# Patient Record
Sex: MALE | Race: BLACK OR AFRICAN AMERICAN | Employment: UNEMPLOYED | URBAN - METROPOLITAN AREA
[De-identification: names, ages, dates, MRNs, and addresses within clinical notes are randomized per-mention and may not be internally consistent; named-entity substitution may affect disease eponyms.]

---
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
## 2023-07-11 ENCOUNTER — HOSPITAL ENCOUNTER (EMERGENCY)
Facility: HOSPITAL | Age: 18
Discharge: HOME | End: 2023-07-11
Attending: EMERGENCY MEDICINE
Payer: COMMERCIAL

## 2023-07-11 VITALS
RESPIRATION RATE: 14 BRPM | WEIGHT: 138 LBS | SYSTOLIC BLOOD PRESSURE: 108 MMHG | HEART RATE: 77 BPM | OXYGEN SATURATION: 98 % | TEMPERATURE: 98.7 F | HEIGHT: 73 IN | BODY MASS INDEX: 18.29 KG/M2 | DIASTOLIC BLOOD PRESSURE: 56 MMHG

## 2023-07-11 DIAGNOSIS — I86.1 LEFT VARICOCELE: Primary | ICD-10-CM

## 2023-07-11 LAB
APPEARANCE UR: CLEAR
BACTERIA URNS QL MICRO: NEGATIVE /HPF
BILIRUB UR QL: NEGATIVE
COLOR UR: ABNORMAL
EPITH CASTS URNS QL MICRO: ABNORMAL /LPF
GLUCOSE UR STRIP.AUTO-MCNC: NEGATIVE MG/DL
HGB UR QL STRIP: NEGATIVE
KETONES UR QL STRIP.AUTO: ABNORMAL MG/DL
LEUKOCYTE ESTERASE UR QL STRIP.AUTO: NEGATIVE
NITRITE UR QL STRIP.AUTO: NEGATIVE
PH UR STRIP: 6.5 (ref 5–8)
PROT UR STRIP-MCNC: 30 MG/DL
RBC #/AREA URNS HPF: ABNORMAL /HPF (ref 0–5)
SP GR UR REFRACTOMETRY: 1.02
URINE CULTURE IF INDICATED: ABNORMAL
UROBILINOGEN UR QL STRIP.AUTO: 1 EU/DL (ref 0.2–1)
WBC URNS QL MICRO: ABNORMAL /HPF (ref 0–4)

## 2023-07-11 PROCEDURE — 99283 EMERGENCY DEPT VISIT LOW MDM: CPT

## 2023-07-11 PROCEDURE — 81001 URINALYSIS AUTO W/SCOPE: CPT

## 2023-07-11 ASSESSMENT — PAIN - FUNCTIONAL ASSESSMENT: PAIN_FUNCTIONAL_ASSESSMENT: NONE - DENIES PAIN

## 2023-07-11 NOTE — DISCHARGE INSTRUCTIONS
Please follow-up with your primary care doctor. Return for new or worsening symptoms anytime. You can also follow-up with the urologist as needed.

## 2023-07-11 NOTE — ED TRIAGE NOTES
Denies testicular pain but reports that he was checking his testicles last night and one felt different from the other.   States felt like one of them has \"busted\"

## 2023-07-11 NOTE — ED PROVIDER NOTES
[address] EMERGENCY DEPT  EMERGENCY DEPARTMENT ENCOUNTER       Pt Name: Rocky Jo  MRN: [id number]  [address] [facility] 2005  Date of evaluation: 7/11/2023  Provider: Latanya Khan MD   PCP: Ramiro Sawant MD  Note Started: 7:48 PM EDT 7/11/23     CHIEF COMPLAINT       Chief Complaint   Patient presents with    Testicle Pain        HISTORY OF PRESENT ILLNESS: 1 or more elements      History From: patient, History limited by: none     Rocky Jo is a 21 y.o. male presents to the emergency department with a testicular mass. Patient reports he was feeling his testicles last evening and he noted a mass that felt like \"a parasite. \"  He denies any pain. Denies trauma. Denies urinary symptoms or dysuria or hematuria. No history of intra-abdominal surgeries. No nausea or vomiting. Please See MDM for Additional Details of the HPI/PMH  Nursing Notes were all reviewed and agreed with or any disagreements were addressed in the HPI. REVIEW OF SYSTEMS        Positives and Pertinent negatives as per HPI. PAST HISTORY     Past Medical History:  Past Medical History:   Diagnosis Date    Other ill-defined conditions(829.89)     heart mumur       Past Surgical History:  History reviewed. No pertinent surgical history. Family History:  Family History   Problem Relation Age of Onset    Allergic Rhinitis Sister     Asthma Sister        Social History:  Social History     Tobacco Use    Smoking status: Passive Smoke Exposure - Never Smoker    Smokeless tobacco: Never   Substance Use Topics    Alcohol use: No    Drug use: Yes     Types: Marijuana Araceli Chars)       Allergies: Allergies   Allergen Reactions    Amoxicillin Other (See Comments)     States does not remember reaction.   Mother always told him he was allergic    Penicillins Hives       CURRENT MEDICATIONS      Discharge Medication List as of 7/11/2023  7:56 PM        CONTINUE these medications which have NOT CHANGED    Details   predniSONE 10 MG (21)

## 2024-01-01 ENCOUNTER — HOSPITAL ENCOUNTER (EMERGENCY)
Facility: HOSPITAL | Age: 19
Discharge: HOME | End: 2024-01-01
Attending: EMERGENCY MEDICINE

## 2024-01-01 VITALS
RESPIRATION RATE: 16 BRPM | HEART RATE: 75 BPM | DIASTOLIC BLOOD PRESSURE: 84 MMHG | OXYGEN SATURATION: 99 % | SYSTOLIC BLOOD PRESSURE: 133 MMHG | TEMPERATURE: 98.4 F

## 2024-01-01 DIAGNOSIS — K08.89 DENTALGIA: Primary | ICD-10-CM

## 2024-01-01 PROCEDURE — 6370000000 HC RX 637 (ALT 250 FOR IP): Performed by: EMERGENCY MEDICINE

## 2024-01-01 PROCEDURE — 99283 EMERGENCY DEPT VISIT LOW MDM: CPT

## 2024-01-01 RX ORDER — NAPROXEN 500 MG/1
500 TABLET ORAL 2 TIMES DAILY WITH MEALS
Qty: 20 TABLET | Refills: 0 | Status: SHIPPED | OUTPATIENT
Start: 2024-01-01

## 2024-01-01 RX ORDER — CLINDAMYCIN HYDROCHLORIDE 150 MG/1
450 CAPSULE ORAL 3 TIMES DAILY
Qty: 63 CAPSULE | Refills: 0 | Status: SHIPPED | OUTPATIENT
Start: 2024-01-01 | End: 2024-01-08

## 2024-01-01 RX ADMIN — DIPHENHYDRAMINE HYDROCHLORIDE: 25 SOLUTION ORAL at 02:43

## 2024-01-01 ASSESSMENT — PAIN - FUNCTIONAL ASSESSMENT: PAIN_FUNCTIONAL_ASSESSMENT: 0-10

## 2024-01-01 ASSESSMENT — LIFESTYLE VARIABLES
HOW OFTEN DO YOU HAVE A DRINK CONTAINING ALCOHOL: NEVER
HOW MANY STANDARD DRINKS CONTAINING ALCOHOL DO YOU HAVE ON A TYPICAL DAY: PATIENT DOES NOT DRINK

## 2024-01-01 ASSESSMENT — PAIN DESCRIPTION - DESCRIPTORS: DESCRIPTORS: ACHING

## 2024-01-01 ASSESSMENT — PAIN SCALES - GENERAL: PAINLEVEL_OUTOF10: 10

## 2024-01-01 ASSESSMENT — PAIN DESCRIPTION - ORIENTATION: ORIENTATION: RIGHT

## 2024-01-01 ASSESSMENT — PAIN DESCRIPTION - LOCATION: LOCATION: TEETH

## 2024-01-01 NOTE — ED PROVIDER NOTES
[facility] EMERGENCY DEPT  EMERGENCY DEPARTMENT ENCOUNTER       Pt Name: Juan Carlos Corrales  MRN: [id number]  Birthdate 2005  Date of evaluation: 1/1/2024  Provider: Halie Morrissey MD   PCP: Ngozi Blair MD  Note Started: 2:34 AM EST 1/1/24     CHIEF COMPLAINT       Chief Complaint   Patient presents with    Dental Pain        HISTORY OF PRESENT ILLNESS: 1 or more elements      History From: patient, History limited by: none     Juan Carlos Corrales is a 18 y.o. male who presents with a cc of dental pain       Please See MDM for Additional Details of the HPI/PMH  Nursing Notes were all reviewed and agreed with or any disagreements were addressed in the HPI.     REVIEW OF SYSTEMS        Positives and Pertinent negatives as per HPI.    PAST HISTORY     Past Medical History:  Past Medical History:   Diagnosis Date    Other ill-defined conditions(839.89)     heart mumur       Past Surgical History:  History reviewed. No pertinent surgical history.    Family History:  Family History   Problem Relation Age of Onset    Allergic Rhinitis Sister     Asthma Sister        Social History:  Social History     Tobacco Use    Smoking status: Passive Smoke Exposure - Never Smoker    Smokeless tobacco: Never   Substance Use Topics    Alcohol use: No    Drug use: Yes     Types: Marijuana (Weed)       Allergies:  Allergies   Allergen Reactions    Amoxicillin Other (See Comments)     States does not remember reaction.  Mother always told him he was allergic    Penicillins Hives       CURRENT MEDICATIONS      Previous Medications    PREDNISONE 10 MG (21) TBPK    Take as directed       SCREENINGS               No data recorded         PHYSICAL EXAM      ED Triage Vitals [01/01/24 0225]   Enc Vitals Group      /84      Pulse 75      Respirations 16      Temp 98.4 °F (36.9 °C)      Temp Source Oral      SpO2 99 %      Weight       Height       Head Circumference       Peak Flow       Pain Score       Pain Loc       Pain Edu?

## 2024-01-01 NOTE — DISCHARGE INSTRUCTIONS
Emergency Dental Care        [facility]  [address] [address]   Phone: [phone]    [facility]  [address] [place] [address]   Phone: [phone], select option (2) to confirm time for treatment     [facility]  [address] [place] [address]   Monday-Friday: 8am-4pm  Phone: [phone]     [facility]  [facility], [facility] [address]  Phone: [phone] to confirm a time for emergency treatment  Pediatrics: [phone]     [facility]  Phone: [phone]    [facility]  [address] [facility] [address]   Phone [phone] or [phone]  Hours [id number] (extractions)  Simple tooth extraction: $60 per tooth, $55 per x-ray     [facility] (in [place])  [facility] only  Phone: [phone], leave message saying you need an appointment to register  Hours: Wed 6-9p     [facility]  Disabled and over age 62 only  Phone: [phone]    Non-Urgent Dental Care Clinics    [facility]  [address] [place] [address]  Phone: [phone]     [facility]  [address] [place] [address]   Dental Clinic: [phone]  Oral Surgery Clinic: [phone]         Local Dentists    [facility]  [address]  [phone]    Martine Wilde DDS  [address]  [phone]    Morris Urbina Jr., DDS  [address]  [phone]    Louis Parker  [address] [place] [address]  [phone]

## 2024-01-01 NOTE — ED NOTES
Pt presents ambulatory to ED complaining of wisdom teeth pain x couple months. Pt reports right side of mouth hurts worse. Denies chest pain, SOB, N/V/D. Pt states, took aleve at 2300 12/31/23 but no relief. Pt reports due to insurance issues, has not been able to schedule a dentist appt. Pt is concerned for an abscess.       Pt is alert and oriented x 4, RR even and unlabored, skin is warm and dry. Assesment completed and pt updated on plan of care.       Emergency Department Nursing Plan of Care       The Nursing Plan of Care is developed from the Nursing assessment and Emergency Department Attending provider initial evaluation.  The plan of care may be reviewed in the “ED Provider note”.    The Plan of Care was developed with the following considerations:   Patient / Family readiness to learn indicated by:verbalized understanding  Persons(s) to be included in education: patient  Barriers to Learning/Limitations:None    Signed     Audelia German RN    1/1/2024   2:31 AM

## 2024-01-01 NOTE — ED NOTES

## 2024-01-01 NOTE — ED TRIAGE NOTES
Triage Note: Patient arrives to ER complaining of dental pain. Patient states he is supposed to have his tooth pulled but due to insurance hasn't had the ability to get it pulled. He is concerned for an abscess.

## 2024-06-14 ENCOUNTER — HOSPITAL ENCOUNTER (EMERGENCY)
Facility: HOSPITAL | Age: 19
Discharge: HOME | End: 2024-06-14
Attending: EMERGENCY MEDICINE

## 2024-06-14 VITALS
RESPIRATION RATE: 18 BRPM | DIASTOLIC BLOOD PRESSURE: 57 MMHG | WEIGHT: 138.2 LBS | TEMPERATURE: 99 F | SYSTOLIC BLOOD PRESSURE: 131 MMHG | BODY MASS INDEX: 18.32 KG/M2 | HEART RATE: 78 BPM | HEIGHT: 73 IN | OXYGEN SATURATION: 97 %

## 2024-06-14 DIAGNOSIS — N30.00 ACUTE CYSTITIS WITHOUT HEMATURIA: Primary | ICD-10-CM

## 2024-06-14 DIAGNOSIS — Z11.3 SCREENING EXAMINATION FOR STI: ICD-10-CM

## 2024-06-14 DIAGNOSIS — R36.9 PENILE DISCHARGE: ICD-10-CM

## 2024-06-14 LAB
APPEARANCE UR: CLEAR
BACTERIA URNS QL MICRO: ABNORMAL /HPF
BILIRUB UR QL: NEGATIVE
COLOR UR: ABNORMAL
EPITH CASTS URNS QL MICRO: ABNORMAL /LPF
GLUCOSE UR STRIP.AUTO-MCNC: NEGATIVE MG/DL
HGB UR QL STRIP: NEGATIVE
KETONES UR QL STRIP.AUTO: ABNORMAL MG/DL
LEUKOCYTE ESTERASE UR QL STRIP.AUTO: ABNORMAL
NITRITE UR QL STRIP.AUTO: NEGATIVE
PH UR STRIP: 6 (ref 5–8)
PROT UR STRIP-MCNC: 30 MG/DL
RBC #/AREA URNS HPF: ABNORMAL /HPF (ref 0–5)
SP GR UR REFRACTOMETRY: 1.02
URINE CULTURE IF INDICATED: ABNORMAL
UROBILINOGEN UR QL STRIP.AUTO: 1 EU/DL (ref 0.2–1)
WBC URNS QL MICRO: ABNORMAL /HPF (ref 0–4)

## 2024-06-14 PROCEDURE — 99283 EMERGENCY DEPT VISIT LOW MDM: CPT

## 2024-06-14 PROCEDURE — 81001 URINALYSIS AUTO W/SCOPE: CPT

## 2024-06-14 PROCEDURE — 87491 CHLMYD TRACH DNA AMP PROBE: CPT

## 2024-06-14 PROCEDURE — 87086 URINE CULTURE/COLONY COUNT: CPT

## 2024-06-14 PROCEDURE — 87591 N.GONORRHOEAE DNA AMP PROB: CPT

## 2024-06-14 RX ORDER — CEPHALEXIN 500 MG/1
500 CAPSULE ORAL 4 TIMES DAILY
Qty: 28 CAPSULE | Refills: 0 | Status: SHIPPED | OUTPATIENT
Start: 2024-06-14 | End: 2024-06-21

## 2024-06-14 ASSESSMENT — PAIN DESCRIPTION - FREQUENCY: FREQUENCY: CONTINUOUS

## 2024-06-14 ASSESSMENT — PAIN DESCRIPTION - LOCATION: LOCATION: PENIS

## 2024-06-14 ASSESSMENT — LIFESTYLE VARIABLES
HOW OFTEN DO YOU HAVE A DRINK CONTAINING ALCOHOL: 2-3 TIMES A WEEK
HOW MANY STANDARD DRINKS CONTAINING ALCOHOL DO YOU HAVE ON A TYPICAL DAY: PATIENT DOES NOT DRINK

## 2024-06-14 ASSESSMENT — PAIN DESCRIPTION - PAIN TYPE: TYPE: ACUTE PAIN

## 2024-06-14 ASSESSMENT — PAIN - FUNCTIONAL ASSESSMENT: PAIN_FUNCTIONAL_ASSESSMENT: 0-10

## 2024-06-14 ASSESSMENT — PAIN SCALES - GENERAL: PAINLEVEL_OUTOF10: 6

## 2024-06-14 NOTE — ED TRIAGE NOTES
Pt to ED via POV complaining of penile pain with urination. +irritation and some discharge from penis. Pt states he popped a pimple there recently. States he is not sexually active.

## 2024-06-15 ASSESSMENT — ENCOUNTER SYMPTOMS
SHORTNESS OF BREATH: 0
NAUSEA: 0
DIARRHEA: 0
COUGH: 0
BACK PAIN: 0
VOMITING: 0
ABDOMINAL PAIN: 0

## 2024-06-15 NOTE — ED NOTES
Patient (s) 1 given copy of dc instructions and 1 script(s). Patient (s)  verbalized understanding of instructions and script (s). Patient given a current medication reconciliation form and verbalized understanding of their medications. Patient (s) verbalized understanding of the importance of discussing medications with his or her physician or clinic they will be following up with. Patient alert and oriented and in no acute distress. .

## 2024-06-15 NOTE — ED PROVIDER NOTES
History:  Social History     Tobacco Use    Smoking status: Passive Smoke Exposure - Never Smoker    Smokeless tobacco: Never   Substance Use Topics    Alcohol use: No    Drug use: Yes     Types: Marijuana (Weed)       Allergies:  Allergies   Allergen Reactions    Amoxicillin Other (See Comments)     States does not remember reaction.  Mother always told him he was allergic    Penicillins Hives         SOCIAL DETERMINANTS OF HEALTH:  Social Determinants of Health     Tobacco Use: Medium Risk (1/1/2024)    Patient History     Smoking Tobacco Use: Passive Smoke Exposure - Never Smoker     Smokeless Tobacco Use: Never     Passive Exposure: Yes   Alcohol Use: Heavy Drinker (6/14/2024)    AUDIT-C     Frequency of Alcohol Consumption: 2-3 times a week     Average Number of Drinks: Patient does not drink     Frequency of Binge Drinking: Less than monthly   Financial Resource Strain: Not on file   Food Insecurity: Not on file   Transportation Needs: Not on file   Physical Activity: Not on file   Stress: Not on file   Social Connections: Not on file   Intimate Partner Violence: Not on file   Depression: Not on file   Housing Stability: Not on file   Interpersonal Safety: Not At Risk (6/14/2024)    Interpersonal Safety Domain Source: IP Abuse Screening     Physical abuse: Denies     Verbal abuse: Denies     Emotional abuse: Denies     Financial abuse: Denies     Sexual abuse: Denies   Utilities: Not on file       CURRENT MEDICATIONS      Previous Medications    NAPROXEN (NAPROSYN) 500 MG TABLET    Take 1 tablet by mouth 2 times daily (with meals)    PREDNISONE 10 MG (21) TBPK    Take as directed         PHYSICAL EXAM      ED Triage Vitals [06/14/24 1947]   Enc Vitals Group      BP (!) 131/57      Pulse 78      Respirations 18      Temp 99 °F (37.2 °C)      Temp Source Oral      SpO2 97 %      Weight - Scale 62.7 kg (138 lb 3.2 oz)      Height 1.854 m (6' 1\")      Head Circumference       Peak Flow       Pain Score       Pain

## 2024-06-15 NOTE — DISCHARGE INSTRUCTIONS
It was a pleasure taking care of you in our Emergency Department today.  We know that when you come to [facility], you are entrusting us with your health, comfort, and safety.  Our physicians and nurses honor that trust, and truly appreciate the opportunity to care for you and your loved ones.    We also value your feedback.  If you receive a survey about your Emergency Department experience today, please fill it out.  We care about our patients' feedback, and we listen to what you have to say.  Thank you!      Dr. Wilda Muñoz MD

## 2024-06-16 LAB
BACTERIA SPEC CULT: NORMAL
SERVICE CMNT-IMP: NORMAL

## 2024-06-17 LAB
C TRACH DNA SPEC QL NAA+PROBE: NEGATIVE
N GONORRHOEA DNA SPEC QL NAA+PROBE: POSITIVE
SAMPLE TYPE: ABNORMAL
SERVICE CMNT-IMP: ABNORMAL
SPECIMEN SOURCE: ABNORMAL

## 2024-06-19 ENCOUNTER — HOSPITAL ENCOUNTER (EMERGENCY)
Facility: HOSPITAL | Age: 19
Discharge: HOME | End: 2024-06-19

## 2024-06-19 VITALS
HEART RATE: 64 BPM | WEIGHT: 138 LBS | RESPIRATION RATE: 16 BRPM | BODY MASS INDEX: 18.21 KG/M2 | OXYGEN SATURATION: 98 % | SYSTOLIC BLOOD PRESSURE: 117 MMHG | TEMPERATURE: 98 F | DIASTOLIC BLOOD PRESSURE: 50 MMHG

## 2024-06-19 DIAGNOSIS — A54.9 GONORRHEA: Primary | ICD-10-CM

## 2024-06-19 PROCEDURE — 6370000000 HC RX 637 (ALT 250 FOR IP): Performed by: NURSE PRACTITIONER

## 2024-06-19 PROCEDURE — 99283 EMERGENCY DEPT VISIT LOW MDM: CPT

## 2024-06-19 RX ORDER — AZITHROMYCIN 500 MG/1
2000 TABLET, FILM COATED ORAL
Status: COMPLETED | OUTPATIENT
Start: 2024-06-19 | End: 2024-06-19

## 2024-06-19 RX ADMIN — AZITHROMYCIN 2000 MG: 500 TABLET, FILM COATED ORAL at 16:09

## 2024-06-19 ASSESSMENT — PAIN - FUNCTIONAL ASSESSMENT: PAIN_FUNCTIONAL_ASSESSMENT: 0-10

## 2024-06-19 NOTE — ED NOTES
Pt presents to ED stating he was received STD testing four days ago and was told he was positive for gonorrhea. Pt states he took some UTI medicine and his sx went away. Pt denies any sx at this time and is just requesting treatment for the positive gonorrhea test. Pt is alert and oriented x 4, RR even and unlabored, skin is warm and dry. Pt appears in NAD at this time. Assessment completed and pt updated on plan of care.  Call bell in reach.  Emergency Department Nursing Plan of Care  The Nursing Plan of Care is developed from the Nursing assessment and Emergency Department Attending provider initial evaluation.  The plan of care may be reviewed in the “ED Provider note”.  The Plan of Care was developed with the following considerations:  Patient / Family readiness to learn indicated by:Refer to Medical chart in [facility]  Persons(s) to be included in education: Refer to Medical chart in [facility]  Barriers to Learning/Limitations:Normal

## 2024-06-19 NOTE — DISCHARGE INSTRUCTIONS
It was a pleasure taking care of you at [facility] Emergency Department today.  We know that when you come to [facility], you are entrusting us with your health, comfort, and safety.  Our physicians and nurses honor that trust, and we truly appreciate the opportunity to care for you and your loved ones.      We also value our feedback.  If you receive a survey about your Emergency Department experience today, please fill it out.  We care about our patients' feedback, and we listen to what you have to say.  Thank you!

## 2024-06-19 NOTE — ED PROVIDER NOTES
[facility] EMERGENCY DEPT  EMERGENCY DEPARTMENT ENCOUNTER       Pt Name: Juan Carlos Corrales  MRN: [id number]  Birthdate 2005  Date of evaluation: 6/19/2024  Provider: RAIN Hidalgo NP   PCP: Ngozi Blair MD  Note Started: 3:42 PM EDT 6/19/24     CHIEF COMPLAINT       Chief Complaint   Patient presents with    Exposure to STD        HISTORY OF PRESENT ILLNESS: 1 or more elements      History From: Patient  HPI Limitations: None     Juan Carlos Corrales is a 18 y.o. male who presents for STD exposure.  Patient was seen 5 days ago for penile pain irritation and discharge.  Patient received a message stating that he is positive for gonorrhea.  He did not receive any treatment during his ER visit.  States symptoms has not resolved since his visit.  No fever, chills, abdominal pain.     Nursing Notes were all reviewed and agreed with or any disagreements were addressed in the HPI.     REVIEW OF SYSTEMS      Review of Systems     Positives and Pertinent negatives as per HPI.    PAST HISTORY     Past Medical History:  Past Medical History:   Diagnosis Date    Other ill-defined conditions(779.89)     heart mumur       Past Surgical History:  History reviewed. No pertinent surgical history.    Family History:  Family History   Problem Relation Age of Onset    Allergic Rhinitis Sister     Asthma Sister        Social History:  Social History     Tobacco Use    Smoking status: Passive Smoke Exposure - Never Smoker    Smokeless tobacco: Never   Substance Use Topics    Alcohol use: No    Drug use: Yes     Types: Marijuana (Weed)       Allergies:  Allergies   Allergen Reactions    Amoxicillin Other (See Comments)     States does not remember reaction.  Mother always told him he was allergic    Penicillins Hives       CURRENT MEDICATIONS      Previous Medications    CEPHALEXIN (KEFLEX) 500 MG CAPSULE    Take 1 capsule by mouth 4 times daily for 7 days    NAPROXEN (NAPROSYN) 500 MG TABLET    Take 1 tablet by mouth 2 times